# Patient Record
Sex: FEMALE | NOT HISPANIC OR LATINO | Employment: OTHER | URBAN - METROPOLITAN AREA
[De-identification: names, ages, dates, MRNs, and addresses within clinical notes are randomized per-mention and may not be internally consistent; named-entity substitution may affect disease eponyms.]

---

## 2021-01-15 ENCOUNTER — APPOINTMENT (OUTPATIENT)
Dept: PREADMISSION TESTING | Facility: HOSPITAL | Age: 76
End: 2021-01-15

## 2021-01-15 VITALS — BODY MASS INDEX: 29.32 KG/M2 | HEIGHT: 66 IN | WEIGHT: 182.4 LBS

## 2021-01-15 LAB
ANION GAP SERPL CALCULATED.3IONS-SCNC: 13 MMOL/L (ref 5–15)
BUN SERPL-MCNC: 21 MG/DL (ref 8–23)
BUN/CREAT SERPL: 17.2 (ref 7–25)
CALCIUM SPEC-SCNC: 10 MG/DL (ref 8.6–10.5)
CHLORIDE SERPL-SCNC: 104 MMOL/L (ref 98–107)
CO2 SERPL-SCNC: 24 MMOL/L (ref 22–29)
CREAT SERPL-MCNC: 1.22 MG/DL (ref 0.57–1)
DEPRECATED RDW RBC AUTO: 48.4 FL (ref 37–54)
ERYTHROCYTE [DISTWIDTH] IN BLOOD BY AUTOMATED COUNT: 13.1 % (ref 12.3–15.4)
FLUAV RNA RESP QL NAA+PROBE: NOT DETECTED
FLUBV RNA RESP QL NAA+PROBE: NOT DETECTED
GFR SERPL CREATININE-BSD FRML MDRD: 43 ML/MIN/1.73
GFR SERPL CREATININE-BSD FRML MDRD: 52 ML/MIN/1.73
GLUCOSE SERPL-MCNC: 106 MG/DL (ref 65–99)
HCT VFR BLD AUTO: 39.5 % (ref 34–46.6)
HGB BLD-MCNC: 12.9 G/DL (ref 12–15.9)
INR PPP: 1.06 (ref 0.85–1.16)
MCH RBC QN AUTO: 32.8 PG (ref 26.6–33)
MCHC RBC AUTO-ENTMCNC: 32.7 G/DL (ref 31.5–35.7)
MCV RBC AUTO: 100.5 FL (ref 79–97)
PLATELET # BLD AUTO: 223 10*3/MM3 (ref 140–450)
PMV BLD AUTO: 9.2 FL (ref 6–12)
POTASSIUM SERPL-SCNC: 4.3 MMOL/L (ref 3.5–5.2)
PROTHROMBIN TIME: 13.5 SECONDS (ref 11.5–14)
RBC # BLD AUTO: 3.93 10*6/MM3 (ref 3.77–5.28)
SARS-COV-2 RNA RESP QL NAA+PROBE: NOT DETECTED
SODIUM SERPL-SCNC: 141 MMOL/L (ref 136–145)
WBC # BLD AUTO: 9.58 10*3/MM3 (ref 3.4–10.8)

## 2021-01-15 PROCEDURE — 36415 COLL VENOUS BLD VENIPUNCTURE: CPT

## 2021-01-15 PROCEDURE — 87636 SARSCOV2 & INF A&B AMP PRB: CPT

## 2021-01-15 PROCEDURE — C9803 HOPD COVID-19 SPEC COLLECT: HCPCS

## 2021-01-15 PROCEDURE — 85027 COMPLETE CBC AUTOMATED: CPT

## 2021-01-15 PROCEDURE — 80048 BASIC METABOLIC PNL TOTAL CA: CPT

## 2021-01-15 PROCEDURE — 85610 PROTHROMBIN TIME: CPT

## 2021-01-15 RX ORDER — ASPIRIN 81 MG/1
81 TABLET ORAL DAILY
COMMUNITY

## 2021-01-15 RX ORDER — ATORVASTATIN CALCIUM 40 MG/1
40 TABLET, FILM COATED ORAL DAILY
COMMUNITY

## 2021-01-15 RX ORDER — CLOPIDOGREL BISULFATE 75 MG/1
75 TABLET ORAL DAILY
COMMUNITY

## 2021-01-15 RX ORDER — LEVOTHYROXINE SODIUM 0.05 MG/1
50 TABLET ORAL DAILY
COMMUNITY

## 2021-01-15 RX ORDER — AMLODIPINE BESYLATE 5 MG/1
5 TABLET ORAL DAILY
COMMUNITY

## 2021-01-15 RX ORDER — BUDESONIDE AND FORMOTEROL FUMARATE DIHYDRATE 160; 4.5 UG/1; UG/1
2 AEROSOL RESPIRATORY (INHALATION)
COMMUNITY

## 2021-01-15 RX ORDER — TRIAMTERENE AND HYDROCHLOROTHIAZIDE 37.5; 25 MG/1; MG/1
1 CAPSULE ORAL EVERY MORNING
COMMUNITY

## 2021-01-15 RX ORDER — METOPROLOL TARTRATE 50 MG/1
50 TABLET, FILM COATED ORAL 2 TIMES DAILY
COMMUNITY

## 2021-01-15 RX ORDER — LEVOCETIRIZINE DIHYDROCHLORIDE 5 MG/1
5 TABLET, FILM COATED ORAL EVERY EVENING
COMMUNITY

## 2021-01-15 RX ORDER — VALSARTAN 160 MG/1
160 TABLET ORAL DAILY
COMMUNITY

## 2021-01-15 RX ORDER — OMEPRAZOLE 20 MG/1
20 CAPSULE, DELAYED RELEASE ORAL DAILY
COMMUNITY

## 2021-01-15 NOTE — PAT
Luna Tavarez, surgery scheduler with Dr. Strickland, notified of positive covid exposure 1 week ago. Rapid Covid test done in PAT.     Patient to apply Chlorhexadine wipes  to surgical area (as instructed) the night before procedure and the AM of procedure. Wipes provided.    Per Anesthesia Request, patient instructed not to take their ACE/ARB medications on the AM of surgery.

## 2021-01-15 NOTE — PAT
Cardiac clearance on chart from Dr. Ray's office. Their office stated they did an EKG however the original got sent to a third party to scan into their system and they do not have it back yet. They did give the patient a copy. I called patient and asked her if she could find the EKG and bring it to surgery Monday.

## 2021-01-17 ENCOUNTER — ANESTHESIA EVENT (OUTPATIENT)
Dept: PERIOP | Facility: HOSPITAL | Age: 76
End: 2021-01-17

## 2021-01-18 ENCOUNTER — ANESTHESIA (OUTPATIENT)
Dept: PERIOP | Facility: HOSPITAL | Age: 76
End: 2021-01-18

## 2021-01-18 ENCOUNTER — HOSPITAL ENCOUNTER (OUTPATIENT)
Facility: HOSPITAL | Age: 76
Discharge: HOME | End: 2021-01-19
Attending: SURGERY | Admitting: SURGERY

## 2021-01-18 ENCOUNTER — APPOINTMENT (OUTPATIENT)
Dept: GENERAL RADIOLOGY | Facility: HOSPITAL | Age: 76
End: 2021-01-18

## 2021-01-18 DIAGNOSIS — I73.9 PERIPHERAL ARTERIAL DISEASE (HCC): Primary | ICD-10-CM

## 2021-01-18 DIAGNOSIS — I74.5 ILIAC ARTERY OCCLUSION (HCC): ICD-10-CM

## 2021-01-18 PROCEDURE — C1887 CATHETER, GUIDING: HCPCS | Performed by: SURGERY

## 2021-01-18 PROCEDURE — C1874 STENT, COATED/COV W/DEL SYS: HCPCS | Performed by: SURGERY

## 2021-01-18 PROCEDURE — 25010000002 HEPARIN (PORCINE) PER 1000 UNITS: Performed by: NURSE ANESTHETIST, CERTIFIED REGISTERED

## 2021-01-18 PROCEDURE — 63710000001 ATORVASTATIN 40 MG TABLET: Performed by: SURGERY

## 2021-01-18 PROCEDURE — 25010000002 DEXAMETHASONE PER 1 MG: Performed by: NURSE ANESTHETIST, CERTIFIED REGISTERED

## 2021-01-18 PROCEDURE — 25010000002 PROTAMINE SULFATE PER 10 MG: Performed by: NURSE ANESTHETIST, CERTIFIED REGISTERED

## 2021-01-18 PROCEDURE — 25010000002 FENTANYL CITRATE (PF) 100 MCG/2ML SOLUTION: Performed by: NURSE ANESTHETIST, CERTIFIED REGISTERED

## 2021-01-18 PROCEDURE — C1769 GUIDE WIRE: HCPCS | Performed by: SURGERY

## 2021-01-18 PROCEDURE — A9270 NON-COVERED ITEM OR SERVICE: HCPCS | Performed by: SURGERY

## 2021-01-18 PROCEDURE — 94799 UNLISTED PULMONARY SVC/PX: CPT

## 2021-01-18 PROCEDURE — 25010000003 LIDOCAINE 1 % SOLUTION: Performed by: NURSE ANESTHETIST, CERTIFIED REGISTERED

## 2021-01-18 PROCEDURE — C1894 INTRO/SHEATH, NON-LASER: HCPCS | Performed by: SURGERY

## 2021-01-18 PROCEDURE — 63710000001 BUDESONIDE-FORMOTEROL 160-4.5 MCG/ACT AEROSOL 6 G INHALER: Performed by: SURGERY

## 2021-01-18 PROCEDURE — 94640 AIRWAY INHALATION TREATMENT: CPT

## 2021-01-18 PROCEDURE — 25010000002 HEPARIN (PORCINE) PER 1000 UNITS: Performed by: SURGERY

## 2021-01-18 PROCEDURE — 63710000001 CLOPIDOGREL 75 MG TABLET: Performed by: SURGERY

## 2021-01-18 PROCEDURE — 63710000001 PANTOPRAZOLE 40 MG TABLET DELAYED-RELEASE: Performed by: SURGERY

## 2021-01-18 PROCEDURE — 25010000002 MIDAZOLAM PER 1 MG: Performed by: ANESTHESIOLOGY

## 2021-01-18 PROCEDURE — 0 IODIXANOL PER 1 ML: Performed by: SURGERY

## 2021-01-18 PROCEDURE — 63710000001 ASPIRIN 81 MG TABLET DELAYED-RELEASE: Performed by: SURGERY

## 2021-01-18 PROCEDURE — C1760 CLOSURE DEV, VASC: HCPCS | Performed by: SURGERY

## 2021-01-18 PROCEDURE — 25010000002 PROPOFOL 10 MG/ML EMULSION: Performed by: NURSE ANESTHETIST, CERTIFIED REGISTERED

## 2021-01-18 PROCEDURE — 25010000002 NEOSTIGMINE 10 MG/10ML SOLUTION: Performed by: NURSE ANESTHETIST, CERTIFIED REGISTERED

## 2021-01-18 PROCEDURE — C1725 CATH, TRANSLUMIN NON-LASER: HCPCS | Performed by: SURGERY

## 2021-01-18 PROCEDURE — 25010000003 CEFAZOLIN IN DEXTROSE 2-4 GM/100ML-% SOLUTION: Performed by: SURGERY

## 2021-01-18 PROCEDURE — 63710000001 METOPROLOL TARTRATE 50 MG TABLET: Performed by: SURGERY

## 2021-01-18 PROCEDURE — 25010000002 ONDANSETRON PER 1 MG: Performed by: NURSE ANESTHETIST, CERTIFIED REGISTERED

## 2021-01-18 PROCEDURE — 25010000003 LIDOCAINE 1 % SOLUTION: Performed by: SURGERY

## 2021-01-18 PROCEDURE — 75716 ARTERY X-RAYS ARMS/LEGS: CPT

## 2021-01-18 PROCEDURE — 63710000001 LEVOTHYROXINE 50 MCG TABLET: Performed by: SURGERY

## 2021-01-18 DEVICE — IMPLANTABLE DEVICE: Type: IMPLANTABLE DEVICE | Site: ARTERY ILIAC | Status: FUNCTIONAL

## 2021-01-18 DEVICE — VIABAHN BX BALLOON EXP ENDO 7MMX79MM 7FR 80CMCATH HEPARIN
Type: IMPLANTABLE DEVICE | Site: ARTERY ILIAC | Status: FUNCTIONAL
Brand: GORE VIABAHN VBX BALLOON EXPANDABLE ENDO

## 2021-01-18 RX ORDER — LIDOCAINE HYDROCHLORIDE 10 MG/ML
INJECTION, SOLUTION INFILTRATION; PERINEURAL AS NEEDED
Status: DISCONTINUED | OUTPATIENT
Start: 2021-01-18 | End: 2021-01-18 | Stop reason: HOSPADM

## 2021-01-18 RX ORDER — ROCURONIUM BROMIDE 10 MG/ML
INJECTION, SOLUTION INTRAVENOUS AS NEEDED
Status: DISCONTINUED | OUTPATIENT
Start: 2021-01-18 | End: 2021-01-18 | Stop reason: SURG

## 2021-01-18 RX ORDER — SODIUM CHLORIDE, SODIUM LACTATE, POTASSIUM CHLORIDE, CALCIUM CHLORIDE 600; 310; 30; 20 MG/100ML; MG/100ML; MG/100ML; MG/100ML
9 INJECTION, SOLUTION INTRAVENOUS CONTINUOUS
Status: DISCONTINUED | OUTPATIENT
Start: 2021-01-18 | End: 2021-01-18

## 2021-01-18 RX ORDER — LIDOCAINE HYDROCHLORIDE 10 MG/ML
0.5 INJECTION, SOLUTION EPIDURAL; INFILTRATION; INTRACAUDAL; PERINEURAL ONCE AS NEEDED
Status: COMPLETED | OUTPATIENT
Start: 2021-01-18 | End: 2021-01-18

## 2021-01-18 RX ORDER — PROPOFOL 10 MG/ML
VIAL (ML) INTRAVENOUS AS NEEDED
Status: DISCONTINUED | OUTPATIENT
Start: 2021-01-18 | End: 2021-01-18 | Stop reason: SURG

## 2021-01-18 RX ORDER — VALSARTAN 160 MG/1
160 TABLET ORAL DAILY
Status: DISCONTINUED | OUTPATIENT
Start: 2021-01-19 | End: 2021-01-19 | Stop reason: HOSPADM

## 2021-01-18 RX ORDER — LEVOTHYROXINE SODIUM 0.05 MG/1
50 TABLET ORAL
Status: DISCONTINUED | OUTPATIENT
Start: 2021-01-18 | End: 2021-01-19 | Stop reason: HOSPADM

## 2021-01-18 RX ORDER — HYDROMORPHONE HYDROCHLORIDE 1 MG/ML
0.5 INJECTION, SOLUTION INTRAMUSCULAR; INTRAVENOUS; SUBCUTANEOUS
Status: DISCONTINUED | OUTPATIENT
Start: 2021-01-18 | End: 2021-01-19 | Stop reason: HOSPADM

## 2021-01-18 RX ORDER — SODIUM CHLORIDE 0.9 % (FLUSH) 0.9 %
10 SYRINGE (ML) INJECTION EVERY 12 HOURS SCHEDULED
Status: DISCONTINUED | OUTPATIENT
Start: 2021-01-18 | End: 2021-01-18 | Stop reason: HOSPADM

## 2021-01-18 RX ORDER — ATORVASTATIN CALCIUM 40 MG/1
40 TABLET, FILM COATED ORAL DAILY
Status: DISCONTINUED | OUTPATIENT
Start: 2021-01-18 | End: 2021-01-19 | Stop reason: HOSPADM

## 2021-01-18 RX ORDER — PROTAMINE SULFATE 10 MG/ML
INJECTION, SOLUTION INTRAVENOUS AS NEEDED
Status: DISCONTINUED | OUTPATIENT
Start: 2021-01-18 | End: 2021-01-18 | Stop reason: SURG

## 2021-01-18 RX ORDER — ASPIRIN 81 MG/1
81 TABLET, CHEWABLE ORAL DAILY
Status: DISCONTINUED | OUTPATIENT
Start: 2021-01-18 | End: 2021-01-18

## 2021-01-18 RX ORDER — TRIAMTERENE AND HYDROCHLOROTHIAZIDE 37.5; 25 MG/1; MG/1
1 TABLET ORAL DAILY
Status: DISCONTINUED | OUTPATIENT
Start: 2021-01-19 | End: 2021-01-19 | Stop reason: HOSPADM

## 2021-01-18 RX ORDER — DEXAMETHASONE SODIUM PHOSPHATE 4 MG/ML
INJECTION, SOLUTION INTRA-ARTICULAR; INTRALESIONAL; INTRAMUSCULAR; INTRAVENOUS; SOFT TISSUE AS NEEDED
Status: DISCONTINUED | OUTPATIENT
Start: 2021-01-18 | End: 2021-01-18 | Stop reason: SURG

## 2021-01-18 RX ORDER — MIDAZOLAM HYDROCHLORIDE 1 MG/ML
2 INJECTION INTRAMUSCULAR; INTRAVENOUS ONCE
Status: COMPLETED | OUTPATIENT
Start: 2021-01-18 | End: 2021-01-18

## 2021-01-18 RX ORDER — GLYCOPYRROLATE 0.2 MG/ML
INJECTION INTRAMUSCULAR; INTRAVENOUS AS NEEDED
Status: DISCONTINUED | OUTPATIENT
Start: 2021-01-18 | End: 2021-01-18 | Stop reason: SURG

## 2021-01-18 RX ORDER — ONDANSETRON 2 MG/ML
INJECTION INTRAMUSCULAR; INTRAVENOUS AS NEEDED
Status: DISCONTINUED | OUTPATIENT
Start: 2021-01-18 | End: 2021-01-18 | Stop reason: SURG

## 2021-01-18 RX ORDER — SODIUM CHLORIDE 9 MG/ML
100 INJECTION, SOLUTION INTRAVENOUS CONTINUOUS
Status: DISCONTINUED | OUTPATIENT
Start: 2021-01-18 | End: 2021-01-18

## 2021-01-18 RX ORDER — EPHEDRINE SULFATE 50 MG/ML
INJECTION, SOLUTION INTRAVENOUS AS NEEDED
Status: DISCONTINUED | OUTPATIENT
Start: 2021-01-18 | End: 2021-01-18 | Stop reason: SURG

## 2021-01-18 RX ORDER — FAMOTIDINE 20 MG/1
20 TABLET, FILM COATED ORAL ONCE
Status: COMPLETED | OUTPATIENT
Start: 2021-01-18 | End: 2021-01-18

## 2021-01-18 RX ORDER — NEOSTIGMINE METHYLSULFATE 1 MG/ML
INJECTION, SOLUTION INTRAVENOUS AS NEEDED
Status: DISCONTINUED | OUTPATIENT
Start: 2021-01-18 | End: 2021-01-18 | Stop reason: SURG

## 2021-01-18 RX ORDER — IODIXANOL 320 MG/ML
INJECTION, SOLUTION INTRAVASCULAR AS NEEDED
Status: DISCONTINUED | OUTPATIENT
Start: 2021-01-18 | End: 2021-01-18 | Stop reason: HOSPADM

## 2021-01-18 RX ORDER — LIDOCAINE HYDROCHLORIDE 20 MG/ML
JELLY TOPICAL AS NEEDED
Status: DISCONTINUED | OUTPATIENT
Start: 2021-01-18 | End: 2021-01-18 | Stop reason: SURG

## 2021-01-18 RX ORDER — CLOPIDOGREL BISULFATE 75 MG/1
75 TABLET ORAL ONCE
Status: COMPLETED | OUTPATIENT
Start: 2021-01-18 | End: 2021-01-18

## 2021-01-18 RX ORDER — AMLODIPINE BESYLATE 5 MG/1
5 TABLET ORAL DAILY
Status: DISCONTINUED | OUTPATIENT
Start: 2021-01-19 | End: 2021-01-19 | Stop reason: HOSPADM

## 2021-01-18 RX ORDER — METOPROLOL TARTRATE 50 MG/1
50 TABLET, FILM COATED ORAL EVERY 12 HOURS SCHEDULED
Status: DISCONTINUED | OUTPATIENT
Start: 2021-01-18 | End: 2021-01-19 | Stop reason: HOSPADM

## 2021-01-18 RX ORDER — SODIUM CHLORIDE 0.9 % (FLUSH) 0.9 %
10 SYRINGE (ML) INJECTION AS NEEDED
Status: DISCONTINUED | OUTPATIENT
Start: 2021-01-18 | End: 2021-01-18 | Stop reason: HOSPADM

## 2021-01-18 RX ORDER — BUDESONIDE AND FORMOTEROL FUMARATE DIHYDRATE 160; 4.5 UG/1; UG/1
2 AEROSOL RESPIRATORY (INHALATION)
Status: DISCONTINUED | OUTPATIENT
Start: 2021-01-18 | End: 2021-01-19 | Stop reason: HOSPADM

## 2021-01-18 RX ORDER — CLOPIDOGREL BISULFATE 75 MG/1
75 TABLET ORAL DAILY
Status: DISCONTINUED | OUTPATIENT
Start: 2021-01-19 | End: 2021-01-19 | Stop reason: HOSPADM

## 2021-01-18 RX ORDER — PANTOPRAZOLE SODIUM 40 MG/1
40 TABLET, DELAYED RELEASE ORAL DAILY
Status: DISCONTINUED | OUTPATIENT
Start: 2021-01-18 | End: 2021-01-19 | Stop reason: HOSPADM

## 2021-01-18 RX ORDER — HEPARIN SODIUM 5000 [USP'U]/ML
5000 INJECTION, SOLUTION INTRAVENOUS; SUBCUTANEOUS EVERY 8 HOURS SCHEDULED
Status: DISCONTINUED | OUTPATIENT
Start: 2021-01-19 | End: 2021-01-19 | Stop reason: HOSPADM

## 2021-01-18 RX ORDER — ASPIRIN 81 MG/1
81 TABLET ORAL DAILY
Status: DISCONTINUED | OUTPATIENT
Start: 2021-01-18 | End: 2021-01-19 | Stop reason: HOSPADM

## 2021-01-18 RX ORDER — FAMOTIDINE 10 MG/ML
20 INJECTION, SOLUTION INTRAVENOUS ONCE
Status: DISCONTINUED | OUTPATIENT
Start: 2021-01-18 | End: 2021-01-18

## 2021-01-18 RX ORDER — SODIUM CHLORIDE 9 MG/ML
9 INJECTION, SOLUTION INTRAVENOUS CONTINUOUS
Status: DISCONTINUED | OUTPATIENT
Start: 2021-01-18 | End: 2021-01-19 | Stop reason: HOSPADM

## 2021-01-18 RX ORDER — HYDROCODONE BITARTRATE AND ACETAMINOPHEN 5; 325 MG/1; MG/1
1 TABLET ORAL EVERY 4 HOURS PRN
Status: DISCONTINUED | OUTPATIENT
Start: 2021-01-18 | End: 2021-01-19

## 2021-01-18 RX ORDER — FENTANYL CITRATE 50 UG/ML
INJECTION, SOLUTION INTRAMUSCULAR; INTRAVENOUS AS NEEDED
Status: DISCONTINUED | OUTPATIENT
Start: 2021-01-18 | End: 2021-01-18 | Stop reason: SURG

## 2021-01-18 RX ORDER — NALOXONE HCL 0.4 MG/ML
0.4 VIAL (ML) INJECTION
Status: DISCONTINUED | OUTPATIENT
Start: 2021-01-18 | End: 2021-01-19 | Stop reason: HOSPADM

## 2021-01-18 RX ORDER — ONDANSETRON 2 MG/ML
4 INJECTION INTRAMUSCULAR; INTRAVENOUS ONCE AS NEEDED
Status: DISCONTINUED | OUTPATIENT
Start: 2021-01-18 | End: 2021-01-18 | Stop reason: HOSPADM

## 2021-01-18 RX ORDER — HEPARIN SODIUM 1000 [USP'U]/ML
INJECTION, SOLUTION INTRAVENOUS; SUBCUTANEOUS AS NEEDED
Status: DISCONTINUED | OUTPATIENT
Start: 2021-01-18 | End: 2021-01-18 | Stop reason: SURG

## 2021-01-18 RX ORDER — FENTANYL CITRATE 50 UG/ML
50 INJECTION, SOLUTION INTRAMUSCULAR; INTRAVENOUS
Status: DISCONTINUED | OUTPATIENT
Start: 2021-01-18 | End: 2021-01-18 | Stop reason: HOSPADM

## 2021-01-18 RX ORDER — CEFAZOLIN SODIUM 2 G/100ML
2 INJECTION, SOLUTION INTRAVENOUS ONCE
Status: COMPLETED | OUTPATIENT
Start: 2021-01-18 | End: 2021-01-18

## 2021-01-18 RX ORDER — LIDOCAINE HYDROCHLORIDE 10 MG/ML
INJECTION, SOLUTION INFILTRATION; PERINEURAL AS NEEDED
Status: DISCONTINUED | OUTPATIENT
Start: 2021-01-18 | End: 2021-01-18 | Stop reason: SURG

## 2021-01-18 RX ORDER — MAGNESIUM HYDROXIDE 1200 MG/15ML
LIQUID ORAL AS NEEDED
Status: DISCONTINUED | OUTPATIENT
Start: 2021-01-18 | End: 2021-01-18 | Stop reason: HOSPADM

## 2021-01-18 RX ADMIN — CEFAZOLIN SODIUM 2 G: 2 INJECTION, SOLUTION INTRAVENOUS at 09:26

## 2021-01-18 RX ADMIN — FENTANYL CITRATE 50 MCG: 50 INJECTION, SOLUTION INTRAMUSCULAR; INTRAVENOUS at 12:55

## 2021-01-18 RX ADMIN — GLYCOPYRROLATE 0.6 MG: 0.4 INJECTION INTRAMUSCULAR; INTRAVENOUS at 11:38

## 2021-01-18 RX ADMIN — METOPROLOL TARTRATE 50 MG: 50 TABLET, FILM COATED ORAL at 21:48

## 2021-01-18 RX ADMIN — EPHEDRINE SULFATE 10 MG: 50 INJECTION, SOLUTION INTRAVENOUS at 09:34

## 2021-01-18 RX ADMIN — PANTOPRAZOLE SODIUM 40 MG: 40 TABLET, DELAYED RELEASE ORAL at 15:34

## 2021-01-18 RX ADMIN — ATORVASTATIN CALCIUM 40 MG: 40 TABLET, FILM COATED ORAL at 15:35

## 2021-01-18 RX ADMIN — SODIUM CHLORIDE: 9 INJECTION, SOLUTION INTRAVENOUS at 11:39

## 2021-01-18 RX ADMIN — ONDANSETRON 4 MG: 2 INJECTION INTRAMUSCULAR; INTRAVENOUS at 11:29

## 2021-01-18 RX ADMIN — PROTAMINE SULFATE 60 MG: 10 INJECTION, SOLUTION INTRAVENOUS at 11:35

## 2021-01-18 RX ADMIN — EPHEDRINE SULFATE 10 MG: 50 INJECTION, SOLUTION INTRAVENOUS at 10:05

## 2021-01-18 RX ADMIN — FAMOTIDINE 20 MG: 20 TABLET, FILM COATED ORAL at 08:11

## 2021-01-18 RX ADMIN — DEXAMETHASONE SODIUM PHOSPHATE 8 MG: 4 INJECTION, SOLUTION INTRA-ARTICULAR; INTRALESIONAL; INTRAMUSCULAR; INTRAVENOUS; SOFT TISSUE at 09:42

## 2021-01-18 RX ADMIN — BUDESONIDE AND FORMOTEROL FUMARATE DIHYDRATE 2 PUFF: 160; 4.5 AEROSOL RESPIRATORY (INHALATION) at 19:24

## 2021-01-18 RX ADMIN — EPHEDRINE SULFATE 10 MG: 50 INJECTION, SOLUTION INTRAVENOUS at 09:37

## 2021-01-18 RX ADMIN — EPHEDRINE SULFATE 10 MG: 50 INJECTION, SOLUTION INTRAVENOUS at 09:45

## 2021-01-18 RX ADMIN — LIDOCAINE HYDROCHLORIDE 50 MG: 10 INJECTION, SOLUTION INFILTRATION; PERINEURAL at 09:32

## 2021-01-18 RX ADMIN — LEVOTHYROXINE SODIUM 50 MCG: 50 TABLET ORAL at 15:35

## 2021-01-18 RX ADMIN — NEOSTIGMINE 4 MG: 1 INJECTION INTRAVENOUS at 11:38

## 2021-01-18 RX ADMIN — LIDOCAINE HYDROCHLORIDE 0.2 ML: 10 INJECTION, SOLUTION EPIDURAL; INFILTRATION; INTRACAUDAL; PERINEURAL at 08:11

## 2021-01-18 RX ADMIN — IPRATROPIUM BROMIDE 0.5 MG: 0.5 SOLUTION RESPIRATORY (INHALATION) at 19:24

## 2021-01-18 RX ADMIN — CLOPIDOGREL BISULFATE 75 MG: 75 TABLET ORAL at 15:34

## 2021-01-18 RX ADMIN — EPHEDRINE SULFATE 10 MG: 50 INJECTION, SOLUTION INTRAVENOUS at 09:51

## 2021-01-18 RX ADMIN — SODIUM CHLORIDE 9 ML/HR: 9 INJECTION, SOLUTION INTRAVENOUS at 08:13

## 2021-01-18 RX ADMIN — FENTANYL CITRATE 50 MCG: 50 INJECTION, SOLUTION INTRAMUSCULAR; INTRAVENOUS at 09:32

## 2021-01-18 RX ADMIN — LIDOCAINE HYDROCHLORIDE 3 ML: 20 JELLY TOPICAL at 09:34

## 2021-01-18 RX ADMIN — ASPIRIN 81 MG: 81 TABLET, COATED ORAL at 15:35

## 2021-01-18 RX ADMIN — PROPOFOL 130 MG: 10 INJECTION, EMULSION INTRAVENOUS at 09:32

## 2021-01-18 RX ADMIN — HEPARIN SODIUM 7000 UNITS: 1000 INJECTION, SOLUTION INTRAVENOUS; SUBCUTANEOUS at 09:59

## 2021-01-18 RX ADMIN — FENTANYL CITRATE 50 MCG: 50 INJECTION, SOLUTION INTRAMUSCULAR; INTRAVENOUS at 11:49

## 2021-01-18 RX ADMIN — MIDAZOLAM 2 MG: 1 INJECTION INTRAMUSCULAR; INTRAVENOUS at 08:52

## 2021-01-18 RX ADMIN — ROCURONIUM BROMIDE 30 MG: 10 INJECTION INTRAVENOUS at 09:32

## 2021-01-18 RX ADMIN — SODIUM CHLORIDE 100 ML/HR: 9 INJECTION, SOLUTION INTRAVENOUS at 15:35

## 2021-01-18 RX ADMIN — ROCURONIUM BROMIDE 10 MG: 10 INJECTION INTRAVENOUS at 10:18

## 2021-01-18 RX ADMIN — HEPARIN SODIUM 2000 UNITS: 1000 INJECTION, SOLUTION INTRAVENOUS; SUBCUTANEOUS at 10:44

## 2021-01-18 NOTE — ANESTHESIA PREPROCEDURE EVALUATION
Anesthesia Evaluation                  Airway   Mallampati: I  TM distance: >3 FB  Neck ROM: full  No difficulty expected  Dental      Pulmonary    (+) COPD,   Cardiovascular     ECG reviewed    (+) hypertension, CAD, CABG,       Neuro/Psych  GI/Hepatic/Renal/Endo    (+)  GERD,      Musculoskeletal     Abdominal    Substance History      OB/GYN          Other                        Anesthesia Plan    ASA 3     general     intravenous induction     Anesthetic plan, all risks, benefits, and alternatives have been provided, discussed and informed consent has been obtained with: patient.    Plan discussed with CRNA.

## 2021-01-18 NOTE — ANESTHESIA PROCEDURE NOTES
Airway  Urgency: elective    Date/Time: 1/18/2021 9:34 AM  Airway not difficult    General Information and Staff    Patient location during procedure: OR  CRNA: Cassy Oliveira CRNA    Indications and Patient Condition  Indications for airway management: airway protection    Preoxygenated: yes  MILS not maintained throughout  Mask difficulty assessment: 1 - vent by mask    Final Airway Details  Final airway type: endotracheal airway      Successful airway: ETT  Cuffed: yes   Successful intubation technique: direct laryngoscopy  Endotracheal tube insertion site: oral  Blade: Esvin  Blade size: 3  ETT size (mm): 7.0  Cormack-Lehane Classification: grade I - full view of glottis  Placement verified by: chest auscultation and capnometry   Measured from: lips  ETT/EBT  to lips (cm): 20  Number of attempts at approach: 1  Assessment: lips, teeth, and gum same as pre-op and atraumatic intubation    Additional Comments  Symmetric chest rise and fall. +ETCO2 +BBS.

## 2021-01-18 NOTE — OP NOTE
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  Patient was offered to undergo a percutaneous revascularization to address her symptoms.    Pre-op Diagnosis:   1. Aortoiliac occlusion    Post-Op Diagnosis Codes:  1. Aortoiliac occlusion    Procedure/CPT® Codes:      Procedure(s):  1. Right brachial, bilateral common femoral artery percutaneous acces  2. Aortogram  3. Bilateral aortoiliac stents (right 7x79 VBX, 7x26 LifeStream; left 7x79 VBX)      Staff:  Surgeon(s):  Joe Strickland MD    Circulator: Margaret Redmond RN  Radiology Technologist: Jaguar Byrd Jessica A, RT  Scrub Person: Fabby Bernal  Nursing Assistant: Miguelina Lowry PCT           Anesthesia: Monitored Anesthesia Care with [facility]    Estimated Blood Loss: 25 mL    Implants:    Implant Name Type Inv. Item Serial No.  Lot No. LRB No. Used Action   STENTGR ENDOPROSTH VIABAHN VBX EXP 7F [id number] 80CM - [id number] - [id number] Implant STENTGR ENDOPROSTH VIABAHN VBX EXP 7F [id number] 80CM [id number] [employer]  Left 1 Implanted   STENTGR ENDOPROSTH VIABAHN VBX EXP 7F [id number] 80CM - [id number] - [id number] Implant STENTGR ENDOPROSTH VIABAHN VBX EXP 7F [id number] 80CM [id number] [employer] NA Right 1 Implanted   STNT CVR VASC LIFESTREAM BALN/EXP 7X26MM 135CM - [id number] Stent STNT CVR VASC LIFESTREAM BALN/EXP 7X26MM 135CM  [place] [employer] [id number] Right 1 Implanted       Specimen:        None        Findings: Excellent flow post procedure without extravasation of contrast, dissection, or residual stenosis.    Complications: None    Description of Procedure:   Patient was brought to the operating room, and laid on the operating room table in  the supine position.  Patient's right upper extremity, abdomen and bilateral groins were prepped and draped in standard surgical fashion.  Perioperative biotics were administered.  Final timeout was performed.  Bilateral common femoral arteries were identified with ultrasound guidance.  Common femoral arteries were accessed lysing micropuncture technique and bilateral 6 Palestinian sheaths were placed.  Heparin was administered.  Contrast injection from each sheath showed patent bilateral external and internal iliac arteries.  The very distal aspect of the common iliac arteries were patent, however the proximal and mid common iliac arteries were occluded.  Utilizing combinations of wires and catheters retrograde crossing of the occluded common iliac arteries bilaterally was unsuccessful.  At this point in time, right brachial artery was accessed in the antecubital fossa.  6 Palestinian sheath was placed.  A glide advantage wire was advanced into the transverse aorta and directed into the descending thoracic aorta.  The short 6 Palestinian sheath was exchanged for a 6 x 90 cm Terumo sheath which was positioned in the distal aorta.  An aortogram was performed which showed severe occlusive disease with heavy calcifications of the distal aorta and occlusion of bilateral common iliac arteries.  Utilizing a combination of wires and catheters the occluded left common iliac artery was crossed from brachial approach and intraluminal reentry was confirmed.  Wire was carefully advanced into the left femoral sheath and pulled out for a through and through access.  At this time attention was turned to the right common iliac artery.  Similar technique was used to cross the occluded right common iliac artery and successful reentry was achieved.  The wire was directed into the right femoral sheath and pulled out for a through and through access.  On the right, the occluded common iliac artery and distal aorta were predilated with a 3 mm balloon.   On the left a 4 mm balloon was used to predilate the occluded left common iliac artery.  After this, the 6 Romanian sheaths were exchanged for a 7 Romanian by 25 cm sheath which were advanced into the distal aorta.  Additional aortogram images were performed to identify the location of the inferior mesenteric artery and to positioned the iliac stents.  Two 7 x 79 mm VBX stent grafts were advanced and positioned with the proximal aspect in the distal aorta, advancing the aortic bifurcation by about 15 mm.  The distal aspects of the stent were in the common iliac arteries, not encroaching onto the internal iliac arteries.  The stents were carefully deployed.  C arm was repositioned into the Portuguese position and contrast injection on the right showed a dissection flap within the distal common iliac artery.  This was successfully treated with a 7 x 26 mm Lifestream stent graft.  C arm was then repositioned into the LAFLEUR position and contrast injection from the left showed excellent stent positioning.  I decided not to post dilate the stents with a larger balloon to avoid risk of distal aortic and iliac rupture.  There was pulsatile flow out of each sheath.  At this point in time heparin effect was reversed with protamine.  The femoral sheaths were removed, and access sites were closed with Perclose devices.  The brachial sheath was removed and manual pressure was held for 20 minutes for hemostasis.  There was no hematoma at the conclusion.  Skin affix was applied to the access sites.  Patient was awakened transferred to the stretcher, taken to recovery in stable condition.  Joe Strickland MD     Date: 1/18/2021  Time: 12:24 EST

## 2021-01-18 NOTE — PROGRESS NOTES
Was asked by patients nurse to evaluate right arm brachial access site. Patient has an upper extremity hematoma at the brachial artery access site. There is no pulsatile mass to suggest a pseudoaneurysm. There is no neuromotor deficit; normal sensation and strength in the hand. Patient denies numbness, weakness, or paresthesias. She has a palpable radial pulse and the hand is warm. I do not think this patient needs a brachial sheath hematoma evacuation at this time. I do not think this hematoma is expanding. She is almost 7 hours post-op. The arm will remain immobilized at with the arm board. I will get a duplex US of the brachial artery in the morning prior to discharge to ensure no pseudoaneurysm is present. I asked the nursing staff to call if there are any additional concerns.

## 2021-01-18 NOTE — ANESTHESIA POSTPROCEDURE EVALUATION
Patient: Susie Clemons    Procedure Summary     Date: 01/18/21 Room / Location: [facility] OR 02 /  [facility]    Anesthesia Start: 0926 Anesthesia Stop: 1207    Procedure: BILATERAL ILIAC ARTERY ANGIOPLASTY WITH STENTING (Bilateral ) Diagnosis:     Surgeon: Joe Strickland MD Provider: Herber Parmar MD    Anesthesia Type: general ASA Status: 3          Anesthesia Type: general    Vitals  Vitals Value Taken Time   /63 01/18/21 1207   Temp 97 °F (36.1 °C) 01/18/21 1207   Pulse 59 01/18/21 1207   Resp 18 01/18/21 1207   SpO2 100 % 01/18/21 1207           Post Anesthesia Care and Evaluation    Patient location during evaluation: PACU  Patient participation: complete - patient participated  Level of consciousness: awake and alert  Pain management: adequate  Airway patency: patent  Anesthetic complications: No anesthetic complications  PONV Status: none  Cardiovascular status: hemodynamically stable and acceptable  Respiratory status: nonlabored ventilation, acceptable and nasal cannula  Hydration status: acceptable

## 2021-01-18 NOTE — H&P
Pre-Op H&P  Susie Clemons  [id number]  1945      Chief complaint: Leg pain      Subjective:  Patient is a 75 y.o.female presents for scheduled surgery by Dr. Strickland. He anticipates a BILATERAL ILIAC ARTERY WITH STENTS AND ALL IDICATED PROCEDURES today. She has BLE pain for several months. She denies use of assistive device for ambulation or recent falls.      Review of Systems:  Constitutional-- No fever, chills or sweats. No fatigue.  CV-- No chest pain, palpitation or syncope. +HTN, CAD. +Cardiac clearance  Resp-- No SOB, cough, hemoptysis  Skin--No rashes or lesions      Allergies: No Known Allergies      Home Meds:  Medications Prior to Admission   Medication Sig Dispense Refill Last Dose   • amLODIPine (NORVASC) 5 MG tablet Take 5 mg by mouth Daily.   1/18/2021 at 0530   • aspirin 81 MG EC tablet Take 81 mg by mouth Daily.   1/17/2021 at 0800   • atorvastatin (LIPITOR) 40 MG tablet Take 40 mg by mouth Daily.   1/17/2021 at 0800   • budesonide-formoterol (SYMBICORT) 160-4.5 MCG/ACT inhaler Inhale 2 puffs 2 (Two) Times a Day.   1/18/2021 at 0530   • clopidogrel (PLAVIX) 75 MG tablet Take 75 mg by mouth Daily.   1/18/2021 at 0530   • levocetirizine (XYZAL) 5 MG tablet Take 5 mg by mouth Every Evening.   1/18/2021 at 0530   • levothyroxine (SYNTHROID, LEVOTHROID) 50 MCG tablet Take 50 mcg by mouth Daily.   1/17/2021 at 0800   • metoprolol tartrate (LOPRESSOR) 50 MG tablet Take 50 mg by mouth 2 (Two) Times a Day.   1/18/2021 at 0530   • omeprazole (priLOSEC) 20 MG capsule Take 20 mg by mouth Daily.   1/17/2021 at 0800   • tiotropium (SPIRIVA) 18 MCG per inhalation capsule Place 1 capsule into inhaler and inhale Daily.   1/18/2021 at 0530   • triamterene-hydrochlorothiazide (DYAZIDE) 37.5-25 MG per capsule Take 1 capsule by mouth Every Morning.   1/18/2021 at 0530   • valsartan (DIOVAN) 160 MG tablet Take 160 mg by mouth Daily.   1/18/2021 at 0530         PMH:   Past Medical History:   Diagnosis Date  "  • Arthritis    • COPD (chronic obstructive pulmonary disease) (CMS/[facility])    • Coronary artery disease    • Disease of thyroid gland    • GERD (gastroesophageal reflux disease)    • Hypertension      PSH:    Past Surgical History:   Procedure Laterality Date   • CARDIAC CATHETERIZATION     • COLONOSCOPY     • CORONARY ARTERY BYPASS GRAFT     • HYSTERECTOMY         Immunization History:  Influenza:   Pneumococcal: UTD  Tetanus: UTD      Social History:   Tobacco:   Social History     Tobacco Use   Smoking Status Former Smoker   • Years: 50.00   • Quit date:    • Years since quittin.0   Smokeless Tobacco Never Used      Alcohol:     Social History     Substance and Sexual Activity   Alcohol Use Not Currently         Physical Exam:/69 (BP Location: Right arm, Patient Position: Lying)   Pulse 56   Temp 98.5 °F (36.9 °C) (Temporal)   Resp 18   Ht 167.6 cm (66\")   Wt 82.6 kg (182 lb)   SpO2 96%   BMI 29.38 kg/m²       General Appearance:    Alert, cooperative, no distress, appears stated age   Head:    Normocephalic, without obvious abnormality, atraumatic   Lungs:     Clear to auscultation bilaterally, respirations unlabored    Heart:   Regular rate and rhythm, S1 and S2 normal    Abdomen:    Soft without tenderness   Breast Exam:    deferred   Genitalia:    deferred   Extremities:   Extremities normal, atraumatic, no cyanosis or edema   Skin:   Skin color, texture, turgor normal, no rashes or lesions   Neurologic:   Grossly intact     Results Review:     LABS:  Lab Results   Component Value Date    WBC 9.58 01/15/2021    HGB 12.9 01/15/2021    HCT 39.5 01/15/2021    .5 (H) 01/15/2021     01/15/2021    GLUCOSE 106 (H) 01/15/2021    BUN 21 01/15/2021    CREATININE 1.22 (H) 01/15/2021    EGFRIFNONA 43 (L) 01/15/2021    EGFRIFAFRI 52 (L) 01/15/2021     01/15/2021    K 4.3 01/15/2021     01/15/2021    CO2 24.0 01/15/2021    CALCIUM 10.0 01/15/2021       RADIOLOGY:  Imaging " Results (Last 72 Hours)     ** No results found for the last 72 hours. **          I reviewed the patient's new clinical results.    Cancer Staging (if applicable)  Cancer Patient: __ yes __no __unknown; If yes, clinical stage T:__ N:__M:__, stage group or __N/A      Impression: BLE pain      Plan: BILATERAL ILIAC ARTERY WITH STENTS AND ALL IDICATED PROCEDURES      Joie Nicholson, PILY   1/18/2021   08:29 EST

## 2021-01-19 ENCOUNTER — APPOINTMENT (OUTPATIENT)
Dept: CARDIOLOGY | Facility: HOSPITAL | Age: 76
End: 2021-01-19

## 2021-01-19 VITALS
BODY MASS INDEX: 29.25 KG/M2 | SYSTOLIC BLOOD PRESSURE: 123 MMHG | WEIGHT: 182 LBS | OXYGEN SATURATION: 94 % | TEMPERATURE: 98.4 F | HEIGHT: 66 IN | DIASTOLIC BLOOD PRESSURE: 81 MMHG | RESPIRATION RATE: 18 BRPM | HEART RATE: 64 BPM

## 2021-01-19 LAB
BH CV ECHO MEAS - BSA(HAYCOCK): 2 M^2
BH CV ECHO MEAS - BSA: 1.9 M^2
BH CV ECHO MEAS - BZI_BMI: 29.4 KILOGRAMS/M^2
BH CV ECHO MEAS - BZI_METRIC_HEIGHT: 167.6 CM
BH CV ECHO MEAS - BZI_METRIC_WEIGHT: 82.6 KG
BH CV UPPER DUPLEX LEFT SUBCLAVIAN  ART PSV: 161 CM/S
BH CV UPPER DUPLEX RIGHT AXILLARY ARTERY PSV: 152 CM/S
BH CV UPPER DUPLEX RIGHT BRACHIAL ART PSV: 113 CM/S
BH CV UPPER DUPLEX RIGHT RADIAL ART PSV: 114 CM/S
BH CV UPPER DUPLEX RIGHT SUBCLAVIAN ART PSV: 170 CM/S
BH CV UPPER DUPLEX RIGHT ULNAR ART PSV: 119 CM/S
BH CV XLRA MEAS LEFT PROX SCLA EDV: 1.3 CM/SEC
BH CV XLRA MEAS LEFT PROX SCLA PSV: 162.2 CM/SEC
BH CV XLRA MEAS RIGHT PROX SCLA EDV: 0.98 CM/SEC
BH CV XLRA MEAS RIGHT PROX SCLA PSV: 170.9 CM/SEC

## 2021-01-19 PROCEDURE — A9270 NON-COVERED ITEM OR SERVICE: HCPCS | Performed by: SURGERY

## 2021-01-19 PROCEDURE — 63710000001 ATORVASTATIN 40 MG TABLET: Performed by: SURGERY

## 2021-01-19 PROCEDURE — 63710000001 VALSARTAN 160 MG TABLET: Performed by: SURGERY

## 2021-01-19 PROCEDURE — 94799 UNLISTED PULMONARY SVC/PX: CPT

## 2021-01-19 PROCEDURE — 63710000001 ASPIRIN 81 MG TABLET DELAYED-RELEASE: Performed by: SURGERY

## 2021-01-19 PROCEDURE — 63710000001 PANTOPRAZOLE 40 MG TABLET DELAYED-RELEASE: Performed by: SURGERY

## 2021-01-19 PROCEDURE — 63710000001 AMLODIPINE 5 MG TABLET: Performed by: SURGERY

## 2021-01-19 PROCEDURE — 93931 UPPER EXTREMITY STUDY: CPT | Performed by: INTERNAL MEDICINE

## 2021-01-19 PROCEDURE — 93931 UPPER EXTREMITY STUDY: CPT

## 2021-01-19 PROCEDURE — 63710000001 CLOPIDOGREL 75 MG TABLET: Performed by: SURGERY

## 2021-01-19 PROCEDURE — 63710000001 TRIAMTERENE-HYDROCHLOROTHIAZIDE 37.5-25 MG TABLET: Performed by: SURGERY

## 2021-01-19 PROCEDURE — 63710000001 METOPROLOL TARTRATE 50 MG TABLET: Performed by: SURGERY

## 2021-01-19 PROCEDURE — 63710000001 LEVOTHYROXINE 50 MCG TABLET: Performed by: SURGERY

## 2021-01-19 RX ORDER — HYDROCODONE BITARTRATE AND ACETAMINOPHEN 5; 325 MG/1; MG/1
1 TABLET ORAL EVERY 6 HOURS PRN
Status: DISCONTINUED | OUTPATIENT
Start: 2021-01-19 | End: 2021-01-19 | Stop reason: HOSPADM

## 2021-01-19 RX ADMIN — LEVOTHYROXINE SODIUM 50 MCG: 50 TABLET ORAL at 05:16

## 2021-01-19 RX ADMIN — METOPROLOL TARTRATE 50 MG: 50 TABLET, FILM COATED ORAL at 09:25

## 2021-01-19 RX ADMIN — CLOPIDOGREL BISULFATE 75 MG: 75 TABLET ORAL at 09:25

## 2021-01-19 RX ADMIN — VALSARTAN 160 MG: 160 TABLET, FILM COATED ORAL at 09:26

## 2021-01-19 RX ADMIN — AMLODIPINE BESYLATE 5 MG: 5 TABLET ORAL at 09:26

## 2021-01-19 RX ADMIN — ATORVASTATIN CALCIUM 40 MG: 40 TABLET, FILM COATED ORAL at 09:25

## 2021-01-19 RX ADMIN — IPRATROPIUM BROMIDE 0.5 MG: 0.5 SOLUTION RESPIRATORY (INHALATION) at 07:28

## 2021-01-19 RX ADMIN — BUDESONIDE AND FORMOTEROL FUMARATE DIHYDRATE 2 PUFF: 160; 4.5 AEROSOL RESPIRATORY (INHALATION) at 07:28

## 2021-01-19 RX ADMIN — ASPIRIN 81 MG: 81 TABLET, COATED ORAL at 09:26

## 2021-01-19 RX ADMIN — PANTOPRAZOLE SODIUM 40 MG: 40 TABLET, DELAYED RELEASE ORAL at 09:25

## 2021-01-19 RX ADMIN — TRIAMTERENE AND HYDROCHLOROTHIAZIDE 1 TABLET: 37.5; 25 TABLET ORAL at 09:26

## 2021-01-19 NOTE — PROGRESS NOTES
LOS: 0 days   Patient Care Team:  Gurdeep Chery DO as PCP - General (Family Medicine)  Marc Ray MD as Consulting Physician (Cardiology)      Subjective     Ms. Clemons states she is doing well. She is hopeful to go home today. Her only complaint is soreness of her RIGHT UE. She states her legs feel better. She states she has not walked far enough to assess her hip pain but she is hopeful that has improved too. We discussed her procedure in detail. We also discussed wound care and follow up instructions.     History taken from: patient    Objective     Vital Signs  Temp:  [96.8 °F (36 °C)-98.4 °F (36.9 °C)] 98.4 °F (36.9 °C)  Heart Rate:  [55-83] 68  Resp:  [18-20] 18  BP: (113-140)/(52-81) 123/81    Physical exam  AAOx3. NAD. Sitting up in bed. No family at bedside  Resp: normal effort on room air  Abd: soft, nontender  RIGHT UE: hematoma surrounding access site- stable, no palpable mass, palpable radial pulse, hand is warm with no signs of ischemia, neuromotor intact  BILATERAL groins: dressings c/d/i, without hematoma  BILATERAL LE: warm, pink, well perfused, no edema, neuromotor intact, no wounds    Results Review:     I reviewed the patient's new clinical results.  CBC    Results from last 7 days   Lab Units 01/15/21  0911   WBC 10*3/mm3 9.58   HEMOGLOBIN g/dL 12.9   PLATELETS 10*3/mm3 223     BMP   Results from last 7 days   Lab Units 01/15/21  0911   SODIUM mmol/L 141   POTASSIUM mmol/L 4.3   CHLORIDE mmol/L 104   CO2 mmol/L 24.0   BUN mg/dL 21   CREATININE mg/dL 1.22*   GLUCOSE mg/dL 106*          Current Facility-Administered Medications:   •  amLODIPine (NORVASC) tablet 5 mg, 5 mg, Oral, Daily, Joe Strickland MD  •  aspirin EC tablet 81 mg, 81 mg, Oral, Daily, Joe Strcikland MD, 81 mg at 01/18/21 1535  •  atorvastatin (LIPITOR) tablet 40 mg, 40 mg, Oral, Daily, Joe Strickland MD, 40 mg at 01/18/21 1535  •  budesonide-formoterol (SYMBICORT) 160-4.5 MCG/ACT inhaler 2  puff, 2 puff, Inhalation, BID - RT, Joe Strickland MD, 2 puff at 01/19/21 0728  •  clopidogrel (PLAVIX) tablet 75 mg, 75 mg, Oral, Daily, Joe Strickland MD  •  heparin (porcine) 5000 UNIT/ML injection 5,000 Units, 5,000 Units, Subcutaneous, Q8H, Joe Strickland MD  •  HYDROcodone-acetaminophen (NORCO) 5-325 MG per tablet 1 tablet, 1 tablet, Oral, Q4H PRN, Joe Strickland MD  •  HYDROmorphone (DILAUDID) injection 0.5 mg, 0.5 mg, Intravenous, Q2H PRN **AND** naloxone (NARCAN) injection 0.4 mg, 0.4 mg, Intravenous, Q5 Min PRN, Joe Strickland MD  •  ipratropium (ATROVENT) nebulizer solution 0.5 mg, 0.5 mg, Nebulization, 4x Daily - RT, Joe Strickland MD, 0.5 mg at 01/19/21 0728  •  levothyroxine (SYNTHROID, LEVOTHROID) tablet 50 mcg, 50 mcg, Oral, Q AM, Joe Strickland MD, 50 mcg at 01/19/21 0516  •  metoprolol tartrate (LOPRESSOR) tablet 50 mg, 50 mg, Oral, Q12H, Joe Strickland MD, 50 mg at 01/18/21 2148  •  pantoprazole (PROTONIX) EC tablet 40 mg, 40 mg, Oral, Daily, Joe Strickland MD, 40 mg at 01/18/21 1534  •  sodium chloride 0.9 % infusion, 9 mL/hr, Intravenous, Continuous, Herber Parmar MD, Last Rate: 9 mL/hr at 01/18/21 0813, New Bag at 01/18/21 1139  •  triamterene-hydrochlorothiazide (MAXZIDE-25) 37.5-25 MG per tablet 1 tablet, 1 tablet, Oral, Daily, Joe Strickland MD  •  valsartan (DIOVAN) tablet 160 mg, 160 mg, Oral, Daily, Joe Strickland MD     Assessment/Plan    Aortoiliac Occlusion  - 1/18: 1. Right brachial, bilateral common femoral artery percutaneous acces              2. Aortogram              3. Bilateral aortoiliac stents (right 7x79 VBX, 7x26 [employer]Stream; left 7x79 VBX)  - ordered RIGHT UE arterial duplex to r/o pseudoaneurysm.   - consult to PT/OT to ambulate  - continue ASA, plavix, and statin  - encouraged hydration  - If no pseudo and pt ambulating well, ok to d/c home today  - f/u in 2 weeks with CHARISMA      Peripheral arterial disease  (CMS/HCC)    Iliac artery occlusion (CMS/HCC)        Keeley Lowry PA-C  01/19/21  08:36 EST

## 2021-01-19 NOTE — DISCHARGE SUMMARY
Date of Discharge:  1/19/2021    Discharge Diagnosis: Aortoiliac occlusion    Presenting Problem/History of Present Illness  Active Hospital Problems    Diagnosis  POA   • Iliac artery occlusion (CMS/HCC) [I74.5]  Yes     Priority: High   • Peripheral arterial disease (CMS/HCC) [I73.9]  Unknown      Resolved Hospital Problems   No resolved problems to display.          Hospital Course  Patient is a 75 y.o. female presented with aortoiliac artery occlusion.  She was admitted under the services of Dr. Joe Strickland. After informed consent was given, the patient was taken to the operating room where She underwent a Abdominal aortogram with BILATERAL iliac artery and aortic stenting. Post procedure, she was admitted to telemetry for monitoring and pain management. She developed a RIGHT UE hematoma surrounding her access site. Compression was placed. On Postop Day #1, she underwent an arterial duplex of the RIGHT UE. This was negative for pseudoaneurysm and had multiphasic throughout. After a stable hospital course the patient was ready for transfer back to home. Pain was controlled. Diet and activity were well tolerated.    Procedures Performed    Procedure(s):  ILIAC ARTERY ANGIOPLASTY WITH STENTING BILATERAL  -------------------       Consults:   Consults     No orders found for last 30 day(s).            Condition on Discharge:  stable    Vital Signs  Temp:  [96.8 °F (36 °C)-98.4 °F (36.9 °C)] 98.4 °F (36.9 °C)  Heart Rate:  [55-83] 64  Resp:  [18-20] 18  BP: (113-140)/(52-81) 123/81    Physical Exam:    AAOx3. NAD. Sitting up in bed. No family at bedside  Resp: normal effort on room air  Abd: soft, nontender  RIGHT UE: hematoma surrounding access site- stable, no palpable mass, palpable radial pulse, hand is warm with no signs of ischemia, neuromotor intact  BILATERAL groins: dressings c/d/i, without hematoma  BILATERAL LE: warm, pink, well perfused, no edema, neuromotor intact, no wounds       Discharge  Disposition  Home or Self Care    Discharge Medications     Discharge Medications      Continue These Medications      Instructions Start Date   amLODIPine 5 MG tablet  Commonly known as: NORVASC   5 mg, Oral, Daily      aspirin 81 MG EC tablet   81 mg, Oral, Daily      atorvastatin 40 MG tablet  Commonly known as: LIPITOR   40 mg, Oral, Daily      budesonide-formoterol 160-4.5 MCG/ACT inhaler  Commonly known as: SYMBICORT   2 puffs, Inhalation, 2 Times Daily - RT      clopidogrel 75 MG tablet  Commonly known as: PLAVIX   75 mg, Oral, Daily      levocetirizine 5 MG tablet  Commonly known as: XYZAL   5 mg, Oral, Every Evening      levothyroxine 50 MCG tablet  Commonly known as: SYNTHROID, LEVOTHROID   50 mcg, Oral, Daily      metoprolol tartrate 50 MG tablet  Commonly known as: LOPRESSOR   50 mg, Oral, 2 Times Daily      omeprazole 20 MG capsule  Commonly known as: priLOSEC   20 mg, Oral, Daily      tiotropium 18 MCG per inhalation capsule  Commonly known as: SPIRIVA   1 capsule, Inhalation, Daily - RT      triamterene-hydrochlorothiazide 37.5-25 MG per capsule  Commonly known as: DYAZIDE   1 capsule, Oral, Every Morning      valsartan 160 MG tablet  Commonly known as: DIOVAN   160 mg, Oral, Daily             Discharge Diet:   Diet Instructions     Advance Diet As Tolerated            Activity at Discharge:   Activity Instructions     Other Activity Instructions      Activity Instructions: no heavy lifting over 10 lbs          Follow-up Appointments  No future appointments.  Additional Instructions for the Follow-ups that You Need to Schedule     Discharge Follow-up with Specified Provider: Dr. Joe Strickland; 2 Weeks   As directed      To: Dr. Joe Strickland    Follow Up: 2 Weeks    Follow Up Details: postop f/u with CHARISMA Lowry PA-C  01/19/21  11:16 EST

## 2021-01-19 NOTE — PLAN OF CARE
Goal Outcome Evaluation:  Plan of Care Reviewed With: patient     Outcome Summary: VSS, pt resting in bed hematoma (hard in places and  warm)  , held pressure 10 minutes for added support. Will continue to monitor.

## (undated) DEVICE — CATH GUIDE SOFTVU FLUSH HT PIG .035 4F 65CM

## (undated) DEVICE — GW AMPLTZ SUPERSTIFF STR .035IN 180CM

## (undated) DEVICE — INTRO SHEATH PRELUDE IDEAL NITNL PALLADIUM 6F .018IN 7X40CM

## (undated) DEVICE — RADIFOCUS GLIDECATH: Brand: GLIDECATH

## (undated) DEVICE — R2P DESTINATION SLENDER GUIDING SHEATH: Brand: R2P DESTINATION SLENDER

## (undated) DEVICE — ULTRAVERSE® 018 PTA BALLOON DILATATION CATHETER 3 MM X 100 MM, 150 CM CATHETER: Brand: ULTRAVERSE® 018

## (undated) DEVICE — INTRAOPERATIVE COVER KIT, 10 PACK: Brand: SITE-RITE

## (undated) DEVICE — KT MINI ACC MAK401

## (undated) DEVICE — INFLATION DEVICE: Brand: ENCORE™ 26

## (undated) DEVICE — CATH SZ ACCUVU SEG/20CM OMNI 5F 70CM

## (undated) DEVICE — Device

## (undated) DEVICE — CATH SUP PROC TRAILBLAZER .014IN 15MM 150CM

## (undated) DEVICE — RADIFOCUS GLIDEWIRE ADVANTAGE TRACK GUIDE WIRE: Brand: GLIDEWIRE ADVANTAGE TRACK

## (undated) DEVICE — PERCLOSE PROGLIDE™ SUTURE-MEDIATED CLOSURE SYSTEM: Brand: PERCLOSE PROGLIDE™

## (undated) DEVICE — CATH SZ ACCUVU SEG/20CM PIG 5F 100CM

## (undated) DEVICE — GLIDEX™ COATED HYDROPHILIC GUIDEWIRE: Brand: MAGIC TORQUE™

## (undated) DEVICE — PK ANGIO OR 10

## (undated) DEVICE — CATH SUP PROC TRAILBLAZER .035IN 150CM

## (undated) DEVICE — SNAP KOVER: Brand: UNBRANDED

## (undated) DEVICE — DECANT BG O JET

## (undated) DEVICE — PINNACLE INTRODUCER SHEATH: Brand: PINNACLE

## (undated) DEVICE — DESTINATION CAROTID GUIDING SHEATH: Brand: DESTINATION

## (undated) DEVICE — TBG INJ CONTRL EXCITE RA 1200PSI 48IN

## (undated) DEVICE — GW MINI ACC MAK SS .018 40CM

## (undated) DEVICE — LBL STRL BLANK

## (undated) DEVICE — PINNACLE R/O II INTRODUCER SHEATH WITH RADIOPAQUE MARKER: Brand: PINNACLE

## (undated) DEVICE — SYR CONTRL LUERLOK 10CC

## (undated) DEVICE — NDL HYPO ECLPS SFTY 22G 1 1/2IN

## (undated) DEVICE — RADIFOCUS GLIDEWIRE ADVANTAGE GUIDEWIRE: Brand: GLIDEWIRE ADVANTAGE

## (undated) DEVICE — GLV SURG BIOGEL LTX PF 7 1/2

## (undated) DEVICE — CVR HNDL LIGHT RIGID